# Patient Record
Sex: MALE | Race: BLACK OR AFRICAN AMERICAN | Employment: OTHER
[De-identification: names, ages, dates, MRNs, and addresses within clinical notes are randomized per-mention and may not be internally consistent; named-entity substitution may affect disease eponyms.]

---

## 2023-05-25 ENCOUNTER — APPOINTMENT (OUTPATIENT)
Facility: HOSPITAL | Age: 33
End: 2023-05-25
Payer: OTHER GOVERNMENT

## 2023-05-25 ENCOUNTER — HOSPITAL ENCOUNTER (EMERGENCY)
Facility: HOSPITAL | Age: 33
Discharge: HOME | End: 2023-05-25
Attending: EMERGENCY MEDICINE
Payer: OTHER GOVERNMENT

## 2023-05-25 VITALS
DIASTOLIC BLOOD PRESSURE: 42 MMHG | RESPIRATION RATE: 18 BRPM | HEART RATE: 75 BPM | WEIGHT: 180 LBS | TEMPERATURE: 98.1 F | BODY MASS INDEX: 24.38 KG/M2 | SYSTOLIC BLOOD PRESSURE: 149 MMHG | OXYGEN SATURATION: 100 % | HEIGHT: 72 IN

## 2023-05-25 DIAGNOSIS — L02.612 ABSCESS OR CELLULITIS OF TOE, LEFT: Primary | ICD-10-CM

## 2023-05-25 DIAGNOSIS — L03.032 ABSCESS OR CELLULITIS OF TOE, LEFT: Primary | ICD-10-CM

## 2023-05-25 PROCEDURE — 99283 EMERGENCY DEPT VISIT LOW MDM: CPT

## 2023-05-25 PROCEDURE — 73630 X-RAY EXAM OF FOOT: CPT

## 2023-05-25 RX ORDER — CEPHALEXIN 250 MG/1
250 CAPSULE ORAL 4 TIMES DAILY
Qty: 40 CAPSULE | Refills: 0 | Status: SHIPPED | OUTPATIENT
Start: 2023-05-25 | End: 2023-06-04

## 2023-05-25 RX ORDER — DOXYCYCLINE HYCLATE 100 MG
100 TABLET ORAL 2 TIMES DAILY
Qty: 20 TABLET | Refills: 0 | Status: SHIPPED | OUTPATIENT
Start: 2023-05-25 | End: 2023-06-04

## 2023-05-31 NOTE — ED PROVIDER NOTES
acute distress. Appearance: Normal appearance. He is normal weight. He is not ill-appearing. Musculoskeletal:        Feet:    Neurological:      General: No focal deficit present. Mental Status: He is alert and oriented to person, place, and time. Psychiatric:         Mood and Affect: Mood normal.         Behavior: Behavior normal.          DIAGNOSTIC RESULTS   LABS:    No results found for this or any previous visit (from the past 24 hour(s)). Labs Reviewed - No data to display       RADIOLOGY:  Non-plain film images such as CT, Ultrasound and MRI are read by the radiologist. Plain radiographic images are visualized and preliminarily interpreted by the ED Provider with the below findings:        Interpretation per the Radiologist below, if available at the time of this note:  XR FOOT LEFT (MIN 3 VIEWS)   Final Result   1. Soft tissue swelling about the fifth digit, greatest at the MTP joint. No   evidence of acute fracture. PROCEDURES   Unless otherwise noted below, none  Procedures         CRITICAL CARE TIME   none    EMERGENCY DEPARTMENT COURSE and DIFFERENTIAL DIAGNOSIS/MDM   Vitals:    Vitals:    05/25/23 1216   BP: (!) 149/42   Pulse: 75   Resp: 18   Temp: 98.1 °F (36.7 °C)   SpO2: 100%   Weight: 180 lb (81.6 kg)   Height: 6' (1.829 m)       Patient was given the following medications:  Medications - No data to display        Records Reviewed (source and summary): Nursing notes. ED COURSE       Medial Decision Making:  DDX: Cellulitis, abscess, puncture wound, fracture    29 y.o. male  In evaluation of the above differential diagnosis, consideration was given to the following tests and treatments: X-ray of the right foot which shows no fracture or acute abnormality of the right fifth toe but exam is consistent with developing cellulitis and obvious abscess that he is not diabetic there is no streaking or fever.   Will start on antibiotics, warm compresses and monitor for any

## 2023-09-08 ENCOUNTER — HOSPITAL ENCOUNTER (EMERGENCY)
Facility: HOSPITAL | Age: 33
Discharge: HOME | End: 2023-09-08
Attending: EMERGENCY MEDICINE
Payer: OTHER GOVERNMENT

## 2023-09-08 ENCOUNTER — APPOINTMENT (OUTPATIENT)
Facility: HOSPITAL | Age: 33
End: 2023-09-08
Payer: OTHER GOVERNMENT

## 2023-09-08 VITALS
DIASTOLIC BLOOD PRESSURE: 62 MMHG | OXYGEN SATURATION: 98 % | HEART RATE: 54 BPM | TEMPERATURE: 98.1 F | SYSTOLIC BLOOD PRESSURE: 113 MMHG | RESPIRATION RATE: 17 BRPM | WEIGHT: 190 LBS | HEIGHT: 72 IN | BODY MASS INDEX: 25.73 KG/M2

## 2023-09-08 DIAGNOSIS — R51.9 ACUTE NONINTRACTABLE HEADACHE, UNSPECIFIED HEADACHE TYPE: Primary | ICD-10-CM

## 2023-09-08 DIAGNOSIS — R00.1 SINUS BRADYCARDIA BY ELECTROCARDIOGRAM: ICD-10-CM

## 2023-09-08 LAB
ALBUMIN SERPL-MCNC: 4 G/DL (ref 3.4–5)
ALBUMIN/GLOB SERPL: 1.3 (ref 0.8–1.7)
ALP SERPL-CCNC: 59 U/L (ref 45–117)
ALT SERPL-CCNC: 34 U/L (ref 16–61)
ANION GAP SERPL CALC-SCNC: 2 MMOL/L (ref 3–18)
AST SERPL-CCNC: 26 U/L (ref 10–38)
BASOPHILS # BLD: 0.1 K/UL (ref 0–0.1)
BASOPHILS NFR BLD: 1 % (ref 0–2)
BILIRUB SERPL-MCNC: 1.5 MG/DL (ref 0.2–1)
BUN SERPL-MCNC: 19 MG/DL (ref 7–18)
BUN/CREAT SERPL: 15 (ref 12–20)
CALCIUM SERPL-MCNC: 9.1 MG/DL (ref 8.5–10.1)
CHLORIDE SERPL-SCNC: 109 MMOL/L (ref 100–111)
CO2 SERPL-SCNC: 28 MMOL/L (ref 21–32)
CREAT SERPL-MCNC: 1.23 MG/DL (ref 0.6–1.3)
DIFFERENTIAL METHOD BLD: ABNORMAL
EKG ATRIAL RATE: 38 BPM
EKG ATRIAL RATE: 38 BPM
EKG DIAGNOSIS: NORMAL
EKG DIAGNOSIS: NORMAL
EKG P AXIS: 37 DEGREES
EKG P AXIS: 55 DEGREES
EKG P-R INTERVAL: 178 MS
EKG P-R INTERVAL: 216 MS
EKG Q-T INTERVAL: 500 MS
EKG Q-T INTERVAL: 504 MS
EKG QRS DURATION: 90 MS
EKG QRS DURATION: 92 MS
EKG QTC CALCULATION (BAZETT): 397 MS
EKG QTC CALCULATION (BAZETT): 400 MS
EKG R AXIS: 21 DEGREES
EKG R AXIS: 32 DEGREES
EKG T AXIS: -1 DEGREES
EKG T AXIS: 0 DEGREES
EKG VENTRICULAR RATE: 38 BPM
EKG VENTRICULAR RATE: 38 BPM
EOSINOPHIL # BLD: 0.1 K/UL (ref 0–0.4)
EOSINOPHIL NFR BLD: 2 % (ref 0–5)
ERYTHROCYTE [DISTWIDTH] IN BLOOD BY AUTOMATED COUNT: 13.7 % (ref 11.6–14.5)
GLOBULIN SER CALC-MCNC: 3.2 G/DL (ref 2–4)
GLUCOSE BLD STRIP.AUTO-MCNC: 101 MG/DL (ref 70–110)
GLUCOSE SERPL-MCNC: 100 MG/DL (ref 74–99)
HCT VFR BLD AUTO: 39.6 % (ref 36–48)
HGB BLD-MCNC: 12.3 G/DL (ref 13–16)
IMM GRANULOCYTES # BLD AUTO: 0 K/UL (ref 0–0.04)
IMM GRANULOCYTES NFR BLD AUTO: 0 % (ref 0–0.5)
LYMPHOCYTES # BLD: 2.1 K/UL (ref 0.9–3.6)
LYMPHOCYTES NFR BLD: 45 % (ref 21–52)
MAGNESIUM SERPL-MCNC: 2.1 MG/DL (ref 1.6–2.6)
MCH RBC QN AUTO: 23.7 PG (ref 24–34)
MCHC RBC AUTO-ENTMCNC: 31.1 G/DL (ref 31–37)
MCV RBC AUTO: 76.3 FL (ref 78–100)
MONOCYTES # BLD: 0.3 K/UL (ref 0.05–1.2)
MONOCYTES NFR BLD: 7 % (ref 3–10)
NEUTS SEG # BLD: 2.1 K/UL (ref 1.8–8)
NEUTS SEG NFR BLD: 45 % (ref 40–73)
NRBC # BLD: 0 K/UL (ref 0–0.01)
NRBC BLD-RTO: 0 PER 100 WBC
NT PRO BNP: 18 PG/ML (ref 0–450)
PLATELET # BLD AUTO: 210 K/UL (ref 135–420)
PMV BLD AUTO: 10.9 FL (ref 9.2–11.8)
POTASSIUM SERPL-SCNC: 4 MMOL/L (ref 3.5–5.5)
PROT SERPL-MCNC: 7.2 G/DL (ref 6.4–8.2)
RBC # BLD AUTO: 5.19 M/UL (ref 4.35–5.65)
SODIUM SERPL-SCNC: 139 MMOL/L (ref 136–145)
TROPONIN I SERPL HS-MCNC: 10 NG/L (ref 0–78)
TROPONIN I SERPL HS-MCNC: 9 NG/L (ref 0–78)
TSH SERPL DL<=0.05 MIU/L-ACNC: 1.21 UIU/ML (ref 0.36–3.74)
WBC # BLD AUTO: 4.7 K/UL (ref 4.6–13.2)

## 2023-09-08 PROCEDURE — 6370000000 HC RX 637 (ALT 250 FOR IP): Performed by: EMERGENCY MEDICINE

## 2023-09-08 PROCEDURE — 83880 ASSAY OF NATRIURETIC PEPTIDE: CPT

## 2023-09-08 PROCEDURE — 93005 ELECTROCARDIOGRAM TRACING: CPT | Performed by: EMERGENCY MEDICINE

## 2023-09-08 PROCEDURE — 99284 EMERGENCY DEPT VISIT MOD MDM: CPT

## 2023-09-08 PROCEDURE — 80053 COMPREHEN METABOLIC PANEL: CPT

## 2023-09-08 PROCEDURE — 85025 COMPLETE CBC W/AUTO DIFF WBC: CPT

## 2023-09-08 PROCEDURE — 82962 GLUCOSE BLOOD TEST: CPT

## 2023-09-08 PROCEDURE — 83735 ASSAY OF MAGNESIUM: CPT

## 2023-09-08 PROCEDURE — 70450 CT HEAD/BRAIN W/O DYE: CPT

## 2023-09-08 PROCEDURE — 84443 ASSAY THYROID STIM HORMONE: CPT

## 2023-09-08 PROCEDURE — 84484 ASSAY OF TROPONIN QUANT: CPT

## 2023-09-08 RX ORDER — BUTALBITAL, ACETAMINOPHEN AND CAFFEINE 300; 40; 50 MG/1; MG/1; MG/1
1 CAPSULE ORAL EVERY 4 HOURS PRN
Qty: 30 CAPSULE | Refills: 0 | Status: SHIPPED | OUTPATIENT
Start: 2023-09-08

## 2023-09-08 RX ORDER — ACETAMINOPHEN 325 MG/1
650 TABLET ORAL
Status: COMPLETED | OUTPATIENT
Start: 2023-09-08 | End: 2023-09-08

## 2023-09-08 RX ADMIN — ACETAMINOPHEN 650 MG: 325 TABLET ORAL at 02:49

## 2023-09-08 ASSESSMENT — PAIN - FUNCTIONAL ASSESSMENT: PAIN_FUNCTIONAL_ASSESSMENT: 0-10

## 2023-09-08 ASSESSMENT — PAIN SCALES - GENERAL
PAINLEVEL_OUTOF10: 5
PAINLEVEL_OUTOF10: 7
PAINLEVEL_OUTOF10: 4

## 2023-09-08 ASSESSMENT — PAIN DESCRIPTION - LOCATION: LOCATION: HEAD

## 2023-09-08 NOTE — ED PROVIDER NOTES
Palpations: Abdomen is soft. Tenderness: There is no abdominal tenderness. There is no guarding or rebound. Musculoskeletal:         General: Normal range of motion. Cervical back: Normal range of motion and neck supple. Right lower leg: No tenderness. No edema. Left lower leg: No tenderness. No edema. Skin:     General: Skin is warm and dry. Capillary Refill: Capillary refill takes less than 2 seconds. Findings: No rash. Neurological:      General: No focal deficit present. Mental Status: He is alert. Comments: Patient is alert, oriented x4. Patient responds appropriately to questioning. Cranial nerves II through XII are grossly intact. Equal strength in the upper and lower extremities bilaterally which is symmetric.   Finger-to-nose and heel-to-shin testing is normal.  Normal sensory exam.         Lab and Diagnostic Study Results     Labs -  Recent Results (from the past 36 hour(s))   EKG 12 Lead    Collection Time: 09/08/23  1:57 AM   Result Value Ref Range    Ventricular Rate 38 BPM    Atrial Rate 38 BPM    P-R Interval 178 ms    QRS Duration 92 ms    Q-T Interval 504 ms    QTc Calculation (Bazett) 400 ms    P Axis 37 degrees    R Axis 21 degrees    T Axis -1 degrees    Diagnosis        Critical Test Result: Low HR , STEMI  Marked sinus bradycardia  Minimal voltage criteria for LVH, may be normal variant ( R in aVL )  ST elevation, consider lateral injury or acute infarct  ** ** ACUTE MI / STEMI ** **  Abnormal ECG  No previous ECGs available     EKG 12 Lead    Collection Time: 09/08/23  2:08 AM   Result Value Ref Range    Ventricular Rate 38 BPM    Atrial Rate 38 BPM    P-R Interval 216 ms    QRS Duration 90 ms    Q-T Interval 500 ms    QTc Calculation (Bazett) 397 ms    P Axis 55 degrees    R Axis 32 degrees    T Axis 0 degrees    Diagnosis        Critical Test Result: Low HR  Marked sinus bradycardia with 1st degree AV block  Minimal voltage criteria for LVH, [address]  [phone]    Call today  Cardiologist    [facility] EMERGENCY DEPT  [address]  [phone]    As needed, If symptoms worsen    Your Primary Care Physician  Call your primary care/family medicine provider to set up a follow-up visit to discuss the care in the emergency room today. Schedule an appointment as soon as possible for a visit in 3 days      Ricardo Croft MD  [address]  [phone]    Call today  Neurologist for follow up      DISCHARGE MEDICATIONS:  Discharge Medication List as of 9/8/2023 11:08 AM        START taking these medications    Details   butalbital-APAP-caffeine -40 MG CAPS per capsule Take 1 capsule by mouth every 4 hours as needed for Headaches Contains acetaminophen (Tylenol), do not exceed 4000 mg of acetaminophen/Tylenol in a 24-hour period if taking from other sources. , Disp-30 capsule, R-0Normal             Zofia Grimm, DO    Patient seen in the context of the Novel Coronavirus (COVID19) pandemic, utilizing contemporary protocols and evidence based on the most up to date available evidence, understanding that the current evidence has the potential to change as additional information becomes available. This note is dictated utilizing Dragon voice recognition software. Unfortunately this leads to occasional typographical errors using the voice recognition. I apologize in advance if the situation occurs. If questions occur please do not hesitate to contact me directly.     Zofia Grimm, [address] [facility], DO  09/08/23 0518

## 2023-09-08 NOTE — ED NOTES
Pt aaox4, wheeled to room 2. Pt states he had nausea/migraine and blurred vision x 2100, but the blurry vision has subsided. Pt states he has been diagnosed with bradycardia and his normal HR is \"high 30s to 40s\". Pt placed on tele monitoring, c/o migraine at this time. Given blanket. MD to assess.       Marie Diop RN  09/08/23 0865

## 2023-09-08 NOTE — ED NOTES
Pt remains on tele monitoring. Nad at this time. Patient resting.   Report given to RANJEET Burns RN  09/08/23 0434

## 2023-09-08 NOTE — ED TRIAGE NOTES
Pt arrives with co migraines, blurred vision, and vomiting that Monday. Pt denies hx of migraines, pt vaccinated from covid.  Pt states \"my HR has been under 40 for a while\"